# Patient Record
Sex: FEMALE | Race: WHITE | NOT HISPANIC OR LATINO | URBAN - METROPOLITAN AREA
[De-identification: names, ages, dates, MRNs, and addresses within clinical notes are randomized per-mention and may not be internally consistent; named-entity substitution may affect disease eponyms.]

---
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
## 2020-06-02 PROBLEM — Z00.00 ENCOUNTER FOR PREVENTIVE HEALTH EXAMINATION: Status: ACTIVE | Noted: 2020-06-02

## 2020-06-03 ENCOUNTER — APPOINTMENT (OUTPATIENT)
Dept: ORTHOPEDIC SURGERY | Facility: CLINIC | Age: 20
End: 2020-06-03
Payer: MEDICAID

## 2020-06-03 VITALS
BODY MASS INDEX: 33.75 KG/M2 | HEART RATE: 78 BPM | DIASTOLIC BLOOD PRESSURE: 83 MMHG | SYSTOLIC BLOOD PRESSURE: 127 MMHG | HEIGHT: 66 IN | WEIGHT: 210 LBS | TEMPERATURE: 98.5 F

## 2020-06-03 DIAGNOSIS — Z80.9 FAMILY HISTORY OF MALIGNANT NEOPLASM, UNSPECIFIED: ICD-10-CM

## 2020-06-03 DIAGNOSIS — Z78.9 OTHER SPECIFIED HEALTH STATUS: ICD-10-CM

## 2020-06-03 DIAGNOSIS — Z82.61 FAMILY HISTORY OF ARTHRITIS: ICD-10-CM

## 2020-06-03 PROCEDURE — 99203 OFFICE O/P NEW LOW 30 MIN: CPT

## 2020-06-03 RX ORDER — MELOXICAM 7.5 MG/1
7.5 TABLET ORAL
Qty: 30 | Refills: 0 | Status: ACTIVE | COMMUNITY
Start: 2020-06-03 | End: 1900-01-01

## 2020-06-19 ENCOUNTER — TRANSCRIPTION ENCOUNTER (OUTPATIENT)
Age: 20
End: 2020-06-19

## 2020-06-19 ENCOUNTER — APPOINTMENT (OUTPATIENT)
Dept: RADIOLOGY | Facility: CLINIC | Age: 20
End: 2020-06-19
Payer: MEDICAID

## 2020-06-19 ENCOUNTER — OUTPATIENT (OUTPATIENT)
Dept: OUTPATIENT SERVICES | Facility: HOSPITAL | Age: 20
LOS: 1 days | End: 2020-06-19
Payer: COMMERCIAL

## 2020-06-19 ENCOUNTER — RESULT REVIEW (OUTPATIENT)
Age: 20
End: 2020-06-19

## 2020-06-19 ENCOUNTER — APPOINTMENT (OUTPATIENT)
Dept: MRI IMAGING | Facility: CLINIC | Age: 20
End: 2020-06-19
Payer: MEDICAID

## 2020-06-19 DIAGNOSIS — Z00.8 ENCOUNTER FOR OTHER GENERAL EXAMINATION: ICD-10-CM

## 2020-06-19 PROCEDURE — 73564 X-RAY EXAM KNEE 4 OR MORE: CPT | Mod: 26,RT

## 2020-06-19 PROCEDURE — 73721 MRI JNT OF LWR EXTRE W/O DYE: CPT | Mod: 26,RT

## 2020-06-19 PROCEDURE — 73721 MRI JNT OF LWR EXTRE W/O DYE: CPT

## 2020-06-19 PROCEDURE — 73564 X-RAY EXAM KNEE 4 OR MORE: CPT

## 2020-06-23 ENCOUNTER — APPOINTMENT (OUTPATIENT)
Dept: ORTHOPEDIC SURGERY | Facility: AMBULATORY SURGERY CENTER | Age: 20
End: 2020-06-23
Payer: MEDICAID

## 2020-06-23 PROCEDURE — 99214 OFFICE O/P EST MOD 30 MIN: CPT | Mod: 95

## 2020-06-23 RX ORDER — MELOXICAM 15 MG/1
15 TABLET ORAL
Qty: 30 | Refills: 2 | Status: ACTIVE | COMMUNITY
Start: 2020-06-23 | End: 1900-01-01

## 2020-06-23 NOTE — PHYSICAL EXAM
[de-identified] : Physical Exam:\par General: Well appearing, no acute distress, A&O\par Neurologic: A&Ox3, No focal deficits\par Head: NCAT without abrasions, lacerations, or ecchymosis to head, face, or scalp\par Eyes: No scleral icterus, no gross abnormalities\par Respiratory: Equal chest wall expansion bilaterally, no accessory muscle use\par Skin: no erythema\par Psychiatric: Normal mood and affect\par \par Right knee: Tenderness palpation noted directly over the tibial tubercle.  Mild amount of erythema over this area.  Mild swelling noted.  Bony prominences noted.  Patient has weakness and pain subjectively with flexion extension of the knee specifically with flexion.  Range of motion is limited with full extension to only about 95 200 degrees of flexion.  Patient has a negative Thessaly sign.  Patient able to flex and extend toes.  Patient to flex and extend ankle.  Patient able to flex and extend hip. [de-identified] : MRI performed of the knee demonstrates fragmentation at the tibial tubercle.  Fluid surrounding the region.

## 2020-06-23 NOTE — HISTORY OF PRESENT ILLNESS
[Home] : at home, [unfilled] , at the time of the visit. [Medical Office: ([facility])___] : at the medical office located in  [Worsening] : worsening [Verbal consent obtained from patient] : the patient, [unfilled] [___ wks] : [unfilled] week(s) ago [8] : a minimum pain level of 8/10 [Intermit.] : ~He/She~ states the symptoms seem to be intermittent [Walking] : worsened by walking [Knee Flexion] : worsened with knee flexion [Knee Extension] : worsened with knee extension [Rest] : relieved by rest

## 2020-06-23 NOTE — DISCUSSION/SUMMARY
[de-identified] : Nicole is a 20-year-old female with patella tendinitis and inflammation over the tibial tubercle from a chronic Osgood-Schlatter's disease.  I thorough discussion with her regarding the nature of her symptoms as well as all treatment options.  Today she elected for nonoperative care with Voltaren gel oral meloxicam and physical therapy.  Prescription for meloxicam and physical therapy were given to her.  I will see her back in 3 months for clinical reassessment.  She agrees with the above plan and all questions were answered.

## 2020-06-23 NOTE — REVIEW OF SYSTEMS
[Joint Pain] : joint pain [Joint Stiffness] : joint stiffness [Negative] : Endocrine [FreeTextEntry9] : right knee

## 2020-06-29 ENCOUNTER — RX RENEWAL (OUTPATIENT)
Age: 20
End: 2020-06-29

## 2020-07-20 ENCOUNTER — APPOINTMENT (OUTPATIENT)
Dept: ORTHOPEDIC SURGERY | Facility: CLINIC | Age: 20
End: 2020-07-20
Payer: MEDICAID

## 2020-07-20 DIAGNOSIS — G89.29 PAIN IN RIGHT KNEE: ICD-10-CM

## 2020-07-20 DIAGNOSIS — M25.561 PAIN IN RIGHT KNEE: ICD-10-CM

## 2020-07-20 DIAGNOSIS — M23.91 UNSPECIFIED INTERNAL DERANGEMENT OF RIGHT KNEE: ICD-10-CM

## 2020-07-20 PROCEDURE — 99214 OFFICE O/P EST MOD 30 MIN: CPT

## 2020-07-20 NOTE — DISCUSSION/SUMMARY
[de-identified] : Jessie is a 20-year-old female with continued worsening right tibial tubercle pain.  This is secondary to edema near the Osgood-Schlatter fragmentation region she had from prior history.  I thorough discussion with her regarding continued nonoperative care versus operative intervention.  We discussed injections surgery physical therapy.  She has done extensive physical therapy with no relief.  She is not interested in cortisone injections.  After thorough discussion with her she would like to attempt surgical intervention.  I had a thorough discussion with her regarding complication risks as well as high possibility of this not working and possibly making her pain worse.  She is understanding is that research on her own.  She accepts the risks and would like to move forward surgical intervention.  All risks, benefits and alternatives to the proposed surgical procedure, open right tibial tubercle debridement with reattachment of patella tendon, were discussed in great detail with the patient. Risks include but are not limited to pain, bleeding, infection, stiffness, medical complications (including DVT, PE, MI), and risks of anesthesia. The patient expressed understanding and all questions were answered. The patient is electing to proceed, and will have the patient scheduled accordingly.

## 2020-07-20 NOTE — PHYSICAL EXAM
[de-identified] : Physical Exam:\par General: Well appearing, no acute distress, A&O\par Neurologic: A&Ox3, No focal deficits\par Head: NCAT without abrasions, lacerations, or ecchymosis to head, face, or scalp\par Eyes: No scleral icterus, no gross abnormalities\par Respiratory: Equal chest wall expansion bilaterally, no accessory muscle use\par Skin: no erythema\par Psychiatric: Normal mood and affect\par \par Right knee: Tenderness palpation noted directly over the tibial tubercle.  Mild amount of erythema over this area.  Mild swelling noted.  Bony prominences noted.  Patient has weakness and pain subjectively with flexion extension of the knee specifically with flexion.  Range of motion is limited with full extension to only about 95 200 degrees of flexion.  Patient has a negative Thessaly sign.  Patient able to flex and extend toes.  Patient to flex and extend ankle.  Patient able to flex and extend hip. [de-identified] : MRI performed of the knee demonstrates fragmentation at the tibial tubercle.  Fluid surrounding the region.

## 2020-07-20 NOTE — REVIEW OF SYSTEMS
[Joint Pain] : joint pain [Joint Stiffness] : joint stiffness [Negative] : Heme/Lymph [FreeTextEntry9] : right knee

## 2020-07-20 NOTE — HISTORY OF PRESENT ILLNESS
[Worsening] : worsening [___ wks] : [unfilled] week(s) ago [8] : a minimum pain level of 8/10 [Walking] : worsened by walking [Intermit.] : ~He/She~ states the symptoms seem to be intermittent [Knee Flexion] : worsened with knee flexion [Knee Extension] : worsened with knee extension [Rest] : relieved by rest [de-identified] : SERENA is a 20 year female who presents today following up on MRI results of right knee.  He is still significant pain in her patella tendon insertion at the tibial tubercle.  Of note she has done several years of physical therapy and other conservative care.  She still significant pain especially going up stairs up a hill and getting up from a seated position.

## 2020-08-12 ENCOUNTER — OUTPATIENT (OUTPATIENT)
Dept: OUTPATIENT SERVICES | Facility: HOSPITAL | Age: 20
LOS: 1 days | End: 2020-08-12
Payer: MEDICAID

## 2020-08-12 VITALS
DIASTOLIC BLOOD PRESSURE: 56 MMHG | TEMPERATURE: 98 F | HEART RATE: 67 BPM | RESPIRATION RATE: 14 BRPM | SYSTOLIC BLOOD PRESSURE: 112 MMHG | WEIGHT: 249.12 LBS | OXYGEN SATURATION: 100 % | HEIGHT: 66 IN

## 2020-08-12 DIAGNOSIS — Z01.818 ENCOUNTER FOR OTHER PREPROCEDURAL EXAMINATION: ICD-10-CM

## 2020-08-12 DIAGNOSIS — M76.51 PATELLAR TENDINITIS, RIGHT KNEE: ICD-10-CM

## 2020-08-12 LAB
ANION GAP SERPL CALC-SCNC: 6 MMOL/L — SIGNIFICANT CHANGE UP (ref 5–17)
APTT BLD: 29.4 SEC — SIGNIFICANT CHANGE UP (ref 27.5–35.5)
BASOPHILS # BLD AUTO: 0.07 K/UL — SIGNIFICANT CHANGE UP (ref 0–0.2)
BASOPHILS NFR BLD AUTO: 0.7 % — SIGNIFICANT CHANGE UP (ref 0–2)
BUN SERPL-MCNC: 12 MG/DL — SIGNIFICANT CHANGE UP (ref 7–23)
CALCIUM SERPL-MCNC: 8.6 MG/DL — SIGNIFICANT CHANGE UP (ref 8.5–10.1)
CHLORIDE SERPL-SCNC: 110 MMOL/L — HIGH (ref 96–108)
CO2 SERPL-SCNC: 25 MMOL/L — SIGNIFICANT CHANGE UP (ref 22–31)
CREAT SERPL-MCNC: 0.96 MG/DL — SIGNIFICANT CHANGE UP (ref 0.5–1.3)
EOSINOPHIL # BLD AUTO: 0.19 K/UL — SIGNIFICANT CHANGE UP (ref 0–0.5)
EOSINOPHIL NFR BLD AUTO: 1.9 % — SIGNIFICANT CHANGE UP (ref 0–6)
GLUCOSE SERPL-MCNC: 100 MG/DL — HIGH (ref 70–99)
HCT VFR BLD CALC: 40.6 % — SIGNIFICANT CHANGE UP (ref 34.5–45)
HGB BLD-MCNC: 13.9 G/DL — SIGNIFICANT CHANGE UP (ref 11.5–15.5)
IMM GRANULOCYTES NFR BLD AUTO: 0.3 % — SIGNIFICANT CHANGE UP (ref 0–1.5)
INR BLD: 1.04 RATIO — SIGNIFICANT CHANGE UP (ref 0.88–1.16)
LYMPHOCYTES # BLD AUTO: 2.44 K/UL — SIGNIFICANT CHANGE UP (ref 1–3.3)
LYMPHOCYTES # BLD AUTO: 24.7 % — SIGNIFICANT CHANGE UP (ref 13–44)
MCHC RBC-ENTMCNC: 31.5 PG — SIGNIFICANT CHANGE UP (ref 27–34)
MCHC RBC-ENTMCNC: 34.2 GM/DL — SIGNIFICANT CHANGE UP (ref 32–36)
MCV RBC AUTO: 92.1 FL — SIGNIFICANT CHANGE UP (ref 80–100)
MONOCYTES # BLD AUTO: 0.92 K/UL — HIGH (ref 0–0.9)
MONOCYTES NFR BLD AUTO: 9.3 % — SIGNIFICANT CHANGE UP (ref 2–14)
NEUTROPHILS # BLD AUTO: 6.22 K/UL — SIGNIFICANT CHANGE UP (ref 1.8–7.4)
NEUTROPHILS NFR BLD AUTO: 63.1 % — SIGNIFICANT CHANGE UP (ref 43–77)
PLATELET # BLD AUTO: 311 K/UL — SIGNIFICANT CHANGE UP (ref 150–400)
POTASSIUM SERPL-MCNC: 4.2 MMOL/L — SIGNIFICANT CHANGE UP (ref 3.5–5.3)
POTASSIUM SERPL-SCNC: 4.2 MMOL/L — SIGNIFICANT CHANGE UP (ref 3.5–5.3)
PROTHROM AB SERPL-ACNC: 12.2 SEC — SIGNIFICANT CHANGE UP (ref 10.6–13.6)
RBC # BLD: 4.41 M/UL — SIGNIFICANT CHANGE UP (ref 3.8–5.2)
RBC # FLD: 12.1 % — SIGNIFICANT CHANGE UP (ref 10.3–14.5)
SODIUM SERPL-SCNC: 141 MMOL/L — SIGNIFICANT CHANGE UP (ref 135–145)
WBC # BLD: 9.87 K/UL — SIGNIFICANT CHANGE UP (ref 3.8–10.5)
WBC # FLD AUTO: 9.87 K/UL — SIGNIFICANT CHANGE UP (ref 3.8–10.5)

## 2020-08-12 PROCEDURE — 85025 COMPLETE CBC W/AUTO DIFF WBC: CPT

## 2020-08-12 PROCEDURE — 85730 THROMBOPLASTIN TIME PARTIAL: CPT

## 2020-08-12 PROCEDURE — 85610 PROTHROMBIN TIME: CPT

## 2020-08-12 PROCEDURE — G0463: CPT | Mod: 25

## 2020-08-12 PROCEDURE — 36415 COLL VENOUS BLD VENIPUNCTURE: CPT

## 2020-08-12 PROCEDURE — 80048 BASIC METABOLIC PNL TOTAL CA: CPT

## 2020-08-12 NOTE — H&P PST ADULT - ATTENDING COMMENTS
Orthopedic Sports Attending:  Agree with above resident/PA note.  Note edited where necessary.      Patient seen and examined at bedside today. Discussed the consent for surgery and discussed risks and complications. Patient understands and would like to move forward with surgical intervention  Isma Stubbs, DO  Orthopaedic Surgery

## 2020-08-12 NOTE — H&P PST ADULT - NSICDXPASTMEDICALHX_GEN_ALL_CORE_FT
PAST MEDICAL HISTORY:  Anxiety     Menorrhagia     Patellar tendinitis right knee    Post traumatic stress disorder (PTSD)     Right knee pain

## 2020-08-12 NOTE — H&P PST ADULT - PRO PAIN LIFE ADAPT
inability to sleep/inability or reluctance to perform ADLs/inability to enjoy life/decreased activity level

## 2020-08-12 NOTE — H&P PST ADULT - ASSESSMENT
yo scheduled for   with      1. Labs as per surgeon  2. EKG  3. Medical optimization with  4. discussed EZ sponges & PST day of procedure instructions. NPO as per instructions from ASU  5. Instructed to increase po fluids the day before surgery. Instructed to hold aspirin, NSAIDs, fish oil, vitamins & herbal supplements 7 days prior to surgery  6. CXR  7. Pt instructed to continue self-quarantine @ home  COVID-19 PCR swab to be done in hospital on    consent signed & on chart. order entered in computer 21 yo female scheduled for right knee open patellar tendon & tibial tubercle debridement  with patellar tendon reattachment on 8/20/2020 with Dr. Stubbs    1. CBC w diff, BMP, PT/INT/PTT  2. discussed EZ sponges & PST day of procedure instructions. NPO as per instructions from ASU  3. Instructed to increase po fluids the day before surgery. Instructed to hold aspirin, NSAIDs, no meloxicam, fish oil, vitamins & herbal supplements 7 days prior to surgery  4. COVID-19 PCR swab to be done in hospital on 8/17, pt aware.   consent signed & on chart. order entered in computer  5. Pt advised to make appt for baseline pap smear. Encouraged patient to discuss possible option of medical marijuana for management of PTSD/anxiety with PCP, pt verbalized understanding.   6. stat urine pregnancy day of surgery

## 2020-08-12 NOTE — H&P PST ADULT - HISTORY OF PRESENT ILLNESS
21 yo female presents to PST. c/o chronic right anterior knee pain. Denies specific mechanism of injury. Reports right knee pain increased over past 6 months & f/u with Dr Stubbs. Reports having xray & MRI of right knee. States "it's unresolved Osgood-Schlatter's". Reports meloxicam didn't help knee pain. c/o occasional right knee swelling. Pt is coming in for right knee patellar tendon surgery. 19 yo female presents to PST. c/o chronic right anterior knee pain since childhood. Denies specific mechanism of injury. Reports right knee pain increased over past 6 months & f/u with Dr Stubbs. Reports having xray & MRI of right knee. States "it's unresolved Osgood-Schlatter's". Reports meloxicam didn't help knee pain. c/o occasional right knee swelling. Pt is coming in for right knee patellar tendon surgery.

## 2020-08-12 NOTE — H&P PST ADULT - HEALTH CARE MAINTENANCE
Patient denies travel outside [place] or the state within past 21 days. Denies cold, cough, loss of smell/taste, flu-like symptoms or fever. Denies contact with persons with diagnosed COVID-19/coronavirus.

## 2020-08-12 NOTE — H&P PST ADULT - NSICDXFAMILYHX_GEN_ALL_CORE_FT
FAMILY HISTORY:  Family history of schizophrenia, uncle living    Father  Still living? Yes, Estimated age: Age Unknown  Family history of diabetes mellitus in father, Age at diagnosis: Age Unknown    Grandparent  Still living? No  Family history of colon cancer, Age at diagnosis: Age Unknown  Family history of ovarian cancer, Age at diagnosis: Age Unknown

## 2020-08-13 ENCOUNTER — NON-APPOINTMENT (OUTPATIENT)
Age: 20
End: 2020-08-13

## 2020-08-13 DIAGNOSIS — Z01.818 ENCOUNTER FOR OTHER PREPROCEDURAL EXAMINATION: ICD-10-CM

## 2020-08-13 DIAGNOSIS — M76.899 OTHER SPECIFIED ENTHESOPATHIES OF UNSPECIFIED LOWER LIMB, EXCLUDING FOOT: ICD-10-CM

## 2020-08-13 DIAGNOSIS — M76.51 PATELLAR TENDINITIS, RIGHT KNEE: ICD-10-CM

## 2020-08-17 ENCOUNTER — OUTPATIENT (OUTPATIENT)
Dept: OUTPATIENT SERVICES | Facility: HOSPITAL | Age: 20
LOS: 1 days | End: 2020-08-17
Payer: MEDICAID

## 2020-08-17 DIAGNOSIS — Z11.59 ENCOUNTER FOR SCREENING FOR OTHER VIRAL DISEASES: ICD-10-CM

## 2020-08-17 PROCEDURE — U0003: CPT

## 2020-08-18 DIAGNOSIS — Z11.59 ENCOUNTER FOR SCREENING FOR OTHER VIRAL DISEASES: ICD-10-CM

## 2020-08-18 LAB — SARS-COV-2 RNA SPEC QL NAA+PROBE: SIGNIFICANT CHANGE UP

## 2020-08-19 RX ORDER — OXYCODONE HYDROCHLORIDE 5 MG/1
5 TABLET ORAL ONCE
Refills: 0 | Status: DISCONTINUED | OUTPATIENT
Start: 2020-08-20 | End: 2020-08-20

## 2020-08-19 RX ORDER — FENTANYL CITRATE 50 UG/ML
50 INJECTION INTRAVENOUS
Refills: 0 | Status: DISCONTINUED | OUTPATIENT
Start: 2020-08-20 | End: 2020-08-20

## 2020-08-19 RX ORDER — SODIUM CHLORIDE 9 MG/ML
1000 INJECTION, SOLUTION INTRAVENOUS
Refills: 0 | Status: DISCONTINUED | OUTPATIENT
Start: 2020-08-20 | End: 2020-08-20

## 2020-08-20 ENCOUNTER — RESULT REVIEW (OUTPATIENT)
Age: 20
End: 2020-08-20

## 2020-08-20 ENCOUNTER — APPOINTMENT (OUTPATIENT)
Dept: ORTHOPEDIC SURGERY | Facility: HOSPITAL | Age: 20
End: 2020-08-20

## 2020-08-20 ENCOUNTER — OUTPATIENT (OUTPATIENT)
Dept: INPATIENT UNIT | Facility: HOSPITAL | Age: 20
LOS: 1 days | Discharge: HOME | End: 2020-08-20
Payer: MEDICAID

## 2020-08-20 VITALS
DIASTOLIC BLOOD PRESSURE: 66 MMHG | TEMPERATURE: 98 F | OXYGEN SATURATION: 95 % | HEART RATE: 79 BPM | RESPIRATION RATE: 16 BRPM | HEIGHT: 66 IN | SYSTOLIC BLOOD PRESSURE: 118 MMHG | WEIGHT: 249.12 LBS

## 2020-08-20 VITALS
SYSTOLIC BLOOD PRESSURE: 117 MMHG | TEMPERATURE: 98 F | RESPIRATION RATE: 15 BRPM | HEART RATE: 61 BPM | OXYGEN SATURATION: 97 % | DIASTOLIC BLOOD PRESSURE: 59 MMHG

## 2020-08-20 DIAGNOSIS — M23.91 UNSPECIFIED INTERNAL DERANGEMENT OF RIGHT KNEE: ICD-10-CM

## 2020-08-20 DIAGNOSIS — M76.51 PATELLAR TENDINITIS, RIGHT KNEE: ICD-10-CM

## 2020-08-20 LAB — HCG UR QL: NEGATIVE — SIGNIFICANT CHANGE UP

## 2020-08-20 PROCEDURE — 27360 PARTIAL REMOVAL LEG BONE(S): CPT | Mod: RT

## 2020-08-20 PROCEDURE — C1713: CPT

## 2020-08-20 PROCEDURE — 81025 URINE PREGNANCY TEST: CPT

## 2020-08-20 PROCEDURE — 27380 REPAIR OF KNEECAP TENDON: CPT | Mod: RT

## 2020-08-20 PROCEDURE — 76000 FLUOROSCOPY <1 HR PHYS/QHP: CPT

## 2020-08-20 RX ORDER — OXYCODONE 5 MG/1
5 TABLET ORAL
Qty: 30 | Refills: 0 | Status: COMPLETED | COMMUNITY
Start: 2020-08-20 | End: 2020-08-27

## 2020-08-20 RX ORDER — OXYCODONE HYDROCHLORIDE 5 MG/1
10 TABLET ORAL ONCE
Refills: 0 | Status: DISCONTINUED | OUTPATIENT
Start: 2020-08-20 | End: 2020-08-20

## 2020-08-20 RX ORDER — ONDANSETRON 8 MG/1
4 TABLET, FILM COATED ORAL ONCE
Refills: 0 | Status: COMPLETED | OUTPATIENT
Start: 2020-08-20 | End: 2020-08-20

## 2020-08-20 RX ORDER — ASPIRIN/CALCIUM CARB/MAGNESIUM 324 MG
1 TABLET ORAL
Qty: 30 | Refills: 0
Start: 2020-08-20 | End: 2020-09-18

## 2020-08-20 RX ORDER — ACETAMINOPHEN 500 MG/1
500 TABLET ORAL
Qty: 120 | Refills: 0 | Status: COMPLETED | COMMUNITY
Start: 2020-08-20 | End: 2020-09-09

## 2020-08-20 RX ORDER — ASPIRIN 325 MG/1
325 TABLET ORAL
Qty: 28 | Refills: 0 | Status: COMPLETED | COMMUNITY
Start: 2020-08-20 | End: 2020-09-17

## 2020-08-20 RX ORDER — ONDANSETRON 4 MG/1
4 TABLET ORAL
Qty: 30 | Refills: 0 | Status: COMPLETED | COMMUNITY
Start: 2020-08-20 | End: 2020-08-25

## 2020-08-20 RX ADMIN — OXYCODONE HYDROCHLORIDE 10 MILLIGRAM(S): 5 TABLET ORAL at 11:47

## 2020-08-20 RX ADMIN — ONDANSETRON 4 MILLIGRAM(S): 8 TABLET, FILM COATED ORAL at 13:14

## 2020-08-20 RX ADMIN — OXYCODONE HYDROCHLORIDE 10 MILLIGRAM(S): 5 TABLET ORAL at 12:01

## 2020-08-20 RX ADMIN — FENTANYL CITRATE 50 MICROGRAM(S): 50 INJECTION INTRAVENOUS at 12:02

## 2020-08-20 RX ADMIN — FENTANYL CITRATE 50 MICROGRAM(S): 50 INJECTION INTRAVENOUS at 12:25

## 2020-08-20 RX ADMIN — FENTANYL CITRATE 50 MICROGRAM(S): 50 INJECTION INTRAVENOUS at 12:05

## 2020-08-20 RX ADMIN — FENTANYL CITRATE 50 MICROGRAM(S): 50 INJECTION INTRAVENOUS at 11:54

## 2020-08-20 NOTE — ASU DISCHARGE PLAN (ADULT/PEDIATRIC) - ASU DC SPECIAL INSTRUCTIONSFT
weightbearing as tolerated right lower extremity in lilibeth, no knee flexion  remove ACE and webril in 48 hours, leave underlying steri strips intact, do not soak  follow up in 7-10 days, call office for appointment

## 2020-08-20 NOTE — ASU DISCHARGE PLAN (ADULT/PEDIATRIC) - ACTIVITY LEVEL
Weight bearing as tolerated/weightbearing as tolerated right lower extremity in lilibeth, no knee flexion

## 2020-08-20 NOTE — ASU PATIENT PROFILE, ADULT - PMH
Anxiety    Menorrhagia    Patellar tendinitis  right knee  Post traumatic stress disorder (PTSD)    Right knee pain

## 2020-08-26 DIAGNOSIS — M92.51 JUVENILE OSTEOCHONDROSIS OF PROXIMAL TIBIA: ICD-10-CM

## 2020-08-26 DIAGNOSIS — Z79.1 LONG TERM (CURRENT) USE OF NON-STEROIDAL ANTI-INFLAMMATORIES (NSAID): ICD-10-CM

## 2020-08-26 DIAGNOSIS — M25.861 OTHER SPECIFIED JOINT DISORDERS, RIGHT KNEE: ICD-10-CM

## 2020-08-26 DIAGNOSIS — E66.01 MORBID (SEVERE) OBESITY DUE TO EXCESS CALORIES: ICD-10-CM

## 2020-08-26 DIAGNOSIS — M25.561 PAIN IN RIGHT KNEE: ICD-10-CM

## 2020-08-26 DIAGNOSIS — M66.261 SPONTANEOUS RUPTURE OF EXTENSOR TENDONS, RIGHT LOWER LEG: ICD-10-CM

## 2020-08-26 DIAGNOSIS — F43.10 POST-TRAUMATIC STRESS DISORDER, UNSPECIFIED: ICD-10-CM

## 2020-08-26 DIAGNOSIS — M76.51 PATELLAR TENDINITIS, RIGHT KNEE: ICD-10-CM

## 2020-08-31 ENCOUNTER — APPOINTMENT (OUTPATIENT)
Dept: ORTHOPEDIC SURGERY | Facility: CLINIC | Age: 20
End: 2020-08-31
Payer: MEDICAID

## 2020-08-31 PROBLEM — M76.50: Chronic | Status: ACTIVE | Noted: 2020-08-12

## 2020-08-31 PROBLEM — M25.561 PAIN IN RIGHT KNEE: Chronic | Status: ACTIVE | Noted: 2020-08-12

## 2020-08-31 PROBLEM — F43.10 POST-TRAUMATIC STRESS DISORDER, UNSPECIFIED: Chronic | Status: ACTIVE | Noted: 2020-08-12

## 2020-08-31 PROBLEM — F41.9 ANXIETY DISORDER, UNSPECIFIED: Chronic | Status: ACTIVE | Noted: 2020-08-12

## 2020-08-31 PROBLEM — N92.0 EXCESSIVE AND FREQUENT MENSTRUATION WITH REGULAR CYCLE: Chronic | Status: ACTIVE | Noted: 2020-08-12

## 2020-08-31 PROCEDURE — 73560 X-RAY EXAM OF KNEE 1 OR 2: CPT | Mod: RT

## 2020-08-31 PROCEDURE — 99024 POSTOP FOLLOW-UP VISIT: CPT

## 2020-08-31 NOTE — HISTORY OF PRESENT ILLNESS
[de-identified] : Jessie is a 20-year-old female status post removal of Osgood-Schlatter tibial tubercle fragment right knee.  She is two-week status post surgery and doing well.  She has minimal pain in her knee.  We did have to repair her patella tendon as well.  She is wearing the brace well.  She has no complaints.  She has not started physical therapy. [de-identified] : Jessie is a 20-year-old female status post removal of loose body near tibial tubercle and patella tendon repair.  Overall she is doing extremely well.  She will start physical therapy to regain range of motion of her knee.  She may be weightbearing as tolerated locked in extension.  I will see her back in 4 weeks for clinical reassessment.  She agrees with the above plan and all questions were answered. [de-identified] : 2 views of the right knee were performed today available for me to review.  They demonstrate removal of Osgood-Schlatter fragment.  No fracture.  No deformities. [de-identified] : Incision clean dry and intact.  No surrounding erythema.  Range of motion is full extension to 45 degrees of flexion.  No motor and sensation intact from L4-S1.  Dorsalis pedis 2+

## 2020-09-29 ENCOUNTER — APPOINTMENT (OUTPATIENT)
Dept: ORTHOPEDIC SURGERY | Facility: CLINIC | Age: 20
End: 2020-09-29
Payer: MEDICAID

## 2020-09-29 DIAGNOSIS — M92.51 JUVENILE OSTEOCHONDROSIS OF TIBIA AND FIBULA, RIGHT LEG: ICD-10-CM

## 2020-09-29 DIAGNOSIS — Z98.890 OTHER SPECIFIED POSTPROCEDURAL STATES: ICD-10-CM

## 2020-09-29 PROCEDURE — 99024 POSTOP FOLLOW-UP VISIT: CPT

## 2020-09-29 NOTE — HISTORY OF PRESENT ILLNESS
[de-identified] : s/p post removal of Osgood-Schlatter tibial tubercle fragment right knee,  DOS: 8/20/2020 [de-identified] : Jessie is a 20-year-old female status post removal of Osgood-Schlatter tibial tubercle fragment right knee.  She is four-week status post surgery and doing well.  She has minimal pain in her knee.  We did have to repair her patella tendon as well.  She is wearing the brace well.  She has no complaints.  She has started physical therapy without complaints. She comes in today smelling of marijuana. [de-identified] : Right Knee\par \par Incision clean dry and intact.  No surrounding erythema.  Range of motion is full extension to 45 degrees of flexion.  No motor and sensation intact from L4-S1.  Dorsalis pedis 2+ [de-identified] : No new imaging performed [de-identified] : Jessie is a 20-year-old female status post removal of loose body near tibial tubercle and patella tendon repair.  Overall she is doing extremely well.  She will continue physical therapy to regain range of motion of her knee.  She may be weightbearing as tolerated within the short runner brace that we provided her today.  I will see her back in 6 weeks for clinical reassessment.  She agrees with the above plan and all questions were answered.

## 2020-11-19 ENCOUNTER — APPOINTMENT (OUTPATIENT)
Dept: ORTHOPEDIC SURGERY | Facility: CLINIC | Age: 20
End: 2020-11-19

## 2020-12-17 ENCOUNTER — APPOINTMENT (OUTPATIENT)
Dept: ORTHOPEDIC SURGERY | Facility: CLINIC | Age: 20
End: 2020-12-17

## 2021-10-23 ENCOUNTER — EMERGENCY (EMERGENCY)
Facility: HOSPITAL | Age: 21
LOS: 1 days | Discharge: HOME | End: 2021-10-23
Attending: EMERGENCY MEDICINE | Admitting: EMERGENCY MEDICINE
Payer: COMMERCIAL

## 2021-10-23 VITALS
HEIGHT: 66 IN | WEIGHT: 235.01 LBS | SYSTOLIC BLOOD PRESSURE: 119 MMHG | RESPIRATION RATE: 20 BRPM | HEART RATE: 112 BPM | DIASTOLIC BLOOD PRESSURE: 74 MMHG | TEMPERATURE: 99 F | OXYGEN SATURATION: 96 %

## 2021-10-23 VITALS
DIASTOLIC BLOOD PRESSURE: 71 MMHG | RESPIRATION RATE: 19 BRPM | HEART RATE: 88 BPM | SYSTOLIC BLOOD PRESSURE: 110 MMHG | TEMPERATURE: 99 F | OXYGEN SATURATION: 97 %

## 2021-10-23 PROCEDURE — 99283 EMERGENCY DEPT VISIT LOW MDM: CPT

## 2021-10-23 RX ORDER — MELOXICAM 15 MG/1
1 TABLET ORAL
Qty: 0 | Refills: 0 | DISCHARGE

## 2021-10-23 RX ORDER — FAMOTIDINE 10 MG/ML
20 INJECTION INTRAVENOUS ONCE
Refills: 0 | Status: COMPLETED | OUTPATIENT
Start: 2021-10-23 | End: 2021-10-23

## 2021-10-23 RX ORDER — NORETHINDRONE AND ETHINYL ESTRADIOL 0.4-0.035
1 KIT ORAL
Qty: 0 | Refills: 0 | DISCHARGE

## 2021-10-23 RX ORDER — DIPHENHYDRAMINE HCL 50 MG
50 CAPSULE ORAL ONCE
Refills: 0 | Status: COMPLETED | OUTPATIENT
Start: 2021-10-23 | End: 2021-10-23

## 2021-10-23 RX ADMIN — Medication 50 MILLIGRAM(S): at 20:58

## 2021-10-23 RX ADMIN — Medication 60 MILLIGRAM(S): at 20:58

## 2021-10-23 RX ADMIN — FAMOTIDINE 20 MILLIGRAM(S): 10 INJECTION INTRAVENOUS at 20:58

## 2021-10-23 NOTE — ED ADULT NURSE NOTE - OBJECTIVE STATEMENT
I noticed my lower lip was swollen around 3pm. I took a nap and when I woke up my lower lip was even more swollen and my top lip slightly swollen. denies allergies. Pt has had laryngitis since Tuesday. Denies diff breathing. negative results  for Covid on Thursday,    Patient denies sick contacts/ no fever/chills/cough at this time, denies SOB and no acute distress. lungs cta, noted lower lip is more swollen than upper, will continue to monitor.

## 2021-10-23 NOTE — ED PROVIDER NOTE - OBJECTIVE STATEMENT
22 y/o female with no PMHx presents to the ED c/o facial swelling at 3PM. Patient reports she was drinking a slurpy flavored cherry and blue raspberry, with subsequent swelling of lips. Denies rash or itching in arms of legs. NKDA and had a difficulty breathing and pain with swallowing, with throat pain.

## 2021-10-23 NOTE — ED ADULT TRIAGE NOTE - CHIEF COMPLAINT QUOTE
I noticed my lower lip was swollen around 3pm. I took a nap and when I woke up my lower lip was even more swollen and my top lip slightly swollen. denies allergies. Pt has had laryngitis since Tuesday. Denies diff breathing. negative results  for Covid on Thursday

## 2021-10-23 NOTE — ED PROVIDER NOTE - NS_ ATTENDINGSCRIBEDETAILS _ED_A_ED_FT
Adonay Quintana MD - The scribe's documentation has been prepared under my direction and personally reviewed by me in its entirety. I confirm that the note above accurately reflects all work, treatment, procedures, and medical decision making performed by me.

## 2021-10-23 NOTE — ED ADULT NURSE NOTE - PAIN RATING/NUMBER SCALE (0-10): ACTIVITY
7 Cellcept Pregnancy And Lactation Text: This medication is Pregnancy Category D and isn't considered safe during pregnancy. It is unknown if this medication is excreted in breast milk.

## 2021-10-23 NOTE — ED PROVIDER NOTE - PATIENT PORTAL LINK FT
You can access the FollowMyHealth Patient Portal offered by [facility] by registering at the following website: [email or web address]. By joining [employer]’s FollowMyHealth portal, you will also be able to view your health information using other applications (apps) compatible with our system.

## 2021-10-23 NOTE — ED PROVIDER NOTE - ENMT, MLM
mild swelling of lips mouth and pharynx normal, no other facial swelling Airway patent, Nasal mucosa clear. Throat has no vesicles, no oropharyngeal exudates and uvula is midline.

## 2022-02-26 ENCOUNTER — EMERGENCY (EMERGENCY)
Facility: HOSPITAL | Age: 22
LOS: 1 days | Discharge: HOME | End: 2022-02-26
Attending: EMERGENCY MEDICINE | Admitting: EMERGENCY MEDICINE
Payer: COMMERCIAL

## 2022-02-26 VITALS
TEMPERATURE: 98 F | HEART RATE: 82 BPM | WEIGHT: 242.07 LBS | OXYGEN SATURATION: 97 % | HEIGHT: 66 IN | SYSTOLIC BLOOD PRESSURE: 134 MMHG | RESPIRATION RATE: 16 BRPM | DIASTOLIC BLOOD PRESSURE: 83 MMHG

## 2022-02-26 VITALS
HEART RATE: 84 BPM | SYSTOLIC BLOOD PRESSURE: 130 MMHG | OXYGEN SATURATION: 98 % | RESPIRATION RATE: 18 BRPM | TEMPERATURE: 98 F | DIASTOLIC BLOOD PRESSURE: 86 MMHG

## 2022-02-26 LAB — HCG UR QL: NEGATIVE — SIGNIFICANT CHANGE UP

## 2022-02-26 PROCEDURE — 81025 URINE PREGNANCY TEST: CPT

## 2022-02-26 PROCEDURE — 99283 EMERGENCY DEPT VISIT LOW MDM: CPT | Mod: 25

## 2022-02-26 PROCEDURE — 73630 X-RAY EXAM OF FOOT: CPT | Mod: 26,RT

## 2022-02-26 PROCEDURE — 73630 X-RAY EXAM OF FOOT: CPT

## 2022-02-26 PROCEDURE — 99283 EMERGENCY DEPT VISIT LOW MDM: CPT

## 2022-02-26 RX ORDER — ACETAMINOPHEN 500 MG
650 TABLET ORAL ONCE
Refills: 0 | Status: DISCONTINUED | OUTPATIENT
Start: 2022-02-26 | End: 2022-02-26

## 2022-02-26 RX ORDER — IBUPROFEN 200 MG
600 TABLET ORAL ONCE
Refills: 0 | Status: COMPLETED | OUTPATIENT
Start: 2022-02-26 | End: 2022-02-26

## 2022-02-26 RX ADMIN — Medication 600 MILLIGRAM(S): at 18:50

## 2022-02-26 RX ADMIN — Medication 600 MILLIGRAM(S): at 19:30

## 2022-02-26 NOTE — ED PROVIDER NOTE - NSFOLLOWUPINSTRUCTIONS_ED_ALL_ED_FT
Consider use of crutches and NSAIDs for pain. Follow up with podiatry. Return for worsening pain, numbness/weakness, rash, worsening condition.       Foot Pain      Many things can cause foot pain. Some common causes are:  •An injury.      •A sprain.      •Arthritis.      •Blisters.      •Bunions.        Follow these instructions at home:      Managing pain, stiffness, and swelling      If directed, put ice on the painful area:  •Put ice in a plastic bag.      •Place a towel between your skin and the bag.      •Leave the ice on for 20 minutes, 2–3 times a day.      Activity     • Do not stand or walk for long periods.      •Return to your normal activities as told by your health care provider. Ask your health care provider what activities are safe for you.      •Do stretches to relieve foot pain and stiffness as told by your health care provider.      • Do not lift anything that is heavier than 10 lb (4.5 kg), or the limit that you are told, until your health care provider says that it is safe. Lifting a lot of weight can put added pressure on your feet.      Lifestyle     •Wear comfortable, supportive shoes that fit you well. Do not wear high heels.      •Keep your feet clean and dry.      General instructions     •Take over-the-counter and prescription medicines only as told by your health care provider.      •Rub your foot gently.      •Pay attention to any changes in your symptoms.      •Keep all follow-up visits as told by your health care provider. This is important.        Contact a health care provider if:    •Your pain does not get better after a few days of self-care.      •Your pain gets worse.      •You cannot stand on your foot.        Get help right away if:    •Your foot is numb or tingling.      •Your foot or toes are swollen.      •Your foot or toes turn white or blue.      •You have warmth and redness along your foot.        Summary    •Common causes of foot pain are injury, sprain, arthritis, blisters or bunions.      •Ice, medicines, and comfortable shoes may help foot pain.      •Contact your health care provider if your pain does not get better after a few days of self-care.      This information is not intended to replace advice given to you by your health care provider. Make sure you discuss any questions you have with your health care provider.

## 2022-02-26 NOTE — ED PROVIDER NOTE - CLINICAL SUMMARY MEDICAL DECISION MAKING FREE TEXT BOX
presents today due to right foot pain x 1 day. pt reports pain began at work yesterday but there was no work related injury.  pt reports pain worse with walking and notes works as a  so she is on her feet for prolonged periods of time. plan includes xray r/o fx, pain control.

## 2022-02-26 NOTE — ED PROVIDER NOTE - PATIENT PORTAL LINK FT
You can access the FollowMyHealth Patient Portal offered by [facility] by registering at the following website: [email or web address]. By joining [employer]’s FollowMyHealth portal, you will also be able to view your health information using other applications (apps) compatible with our system.

## 2022-02-26 NOTE — ED PROVIDER NOTE - PROGRESS NOTE DETAILS
Teresa PARKER for ED attending, Dr. Tellez: 21 y/o F w/ PMHx of PTSD, anxiety, patellar tendinitis, menorrhagia, and R knee pain  presents to ED c/o R foot pain since yesterday worse w/ palpation or walking. Pt relates she works at [employer] and pain began while working. Denies injury, weakness, numbness, or any other complaints.   PE:  GEN: well developed, well nourished, NAD  MSK: R ankle nontender full range of motion, foot +mild tenderness to mid foot, toes nontender, distal sensation intact, cap refill less than 2 seconds all toes. Teresa PARKER for ED attending, Dr. Tellez: 23 y/o F w/ PMHx of PTSD, anxiety, patellar tendinitis, menorrhagia, and R knee pain  presents to ED c/o R foot pain since yesterday worse w/ palpation or walking. Pt relates she works as  and pain began while working. Denies injury, weakness, numbness, or any other complaints.   PE:  GEN: well developed, well nourished, NAD  MSK: R ankle nontender full range of motion, foot +mild tenderness to mid foot, toes nontender, distal sensation intact, cap refill less than 2 seconds all toes.

## 2022-02-26 NOTE — ED PROVIDER NOTE - CARE PROVIDER_API CALL
Sebastian Duncan (DPM)  Podiatric Medicine and Surgery  [address] [place] [address]  Phone: [phone]  Fax: [phone]  Follow Up Time: 1-3 Days

## 2022-02-26 NOTE — ED PROVIDER NOTE - PHYSICAL EXAMINATION
Constitutional: Awake, Alert, non-toxic. NAD. Well appearing, well nourished.   HEAD: Normocephalic, atraumatic.   EYES: EOM intact, conjunctiva and sclera are clear bilaterally.   ENT: No rhinorrhea, patent, mucous membranes pink/moist, no drooling or stridor.   NECK: Supple, non-tender  RESPIRATORY: Normal respiratory effort  EXTREMITIES: Full passive and active ROM in all extremities; (+) right foot 2nd MT TTP, no erythema, no 1st MTP TTP, no edema, negative tatum sign. knee non-tender to palpation; distal pulses palpable and symmetric  SKIN: Warm, dry; good skin turgor, no apparent lesions or rashes, no ecchymosis, brisk capillary refill.  NEURO: A&O x3. Sensory and motor functions are grossly intact. Speech is normal. Appearance and judgement seem appropriate for gender and age.

## 2022-02-26 NOTE — ED PROVIDER NOTE - OBJECTIVE STATEMENT
23 y/o female without reported PMHx presents today due to right foot pain x 1 day. pt reports pain began at work yesterday but there was no work related injury. pt describes pain as aching, non-radiating, and currently 5/10. pt reports pain worse with walking and notes works as a  so she is on her feet for prolonged periods of time. pt denies trauma/fall, numbness/weakness, rash, or any other complaints.

## 2022-07-18 NOTE — ED ADULT NURSE NOTE - CAS EDN DISCHARGE ASSESSMENT
Problem: Knowledge Deficit - Standard  Goal: Patient and family/care givers will demonstrate understanding of plan of care, disease process/condition, diagnostic tests and medications  Outcome: Progressing  Note: Pt AOx3 able to make needs known, pt verbalize understanding of POC.     Problem: Fall Risk  Goal: Patient will remain free from falls  Outcome: Progressing  Note: Guillory 8, Low risk for falls.  Non skid socks, fall band on, hourly rounding in place, call light within reach, path free of clutter. Bed alarm on. Pt calls appropriately. Education provided on need to call staff for assistance with mobility and pt states understanding.    The patient is Stable - Low risk of patient condition declining or worsening    Shift Goals  Clinical Goals: Rest; Mobility  Patient Goals: rest  Family Goals: rest, cath lab monday    Progress made toward(s) clinical / shift goals:  24 hour EEG placement, rest, tele monitor.     Patient is not progressing towards the following goals:       Alert and oriented to person, place and time

## 2022-08-16 NOTE — H&P PST ADULT - TEMPERATURE IN FAHRENHEIT (DEGREES F)
[FreeTextEntry1] : Assessment:\par GREGORY\par Obesity\par \par PLAN:\par The patient is benefitting from the PAP device .  He needs a new CPAP unit ordered.\par New supplies ordered \par Weight loss discussed\par I stressed the need maintain compliance  with the PAP device.\par The patient is not to use an Ozone or UV sterilizer. \par F/U in 3 months\par \par  98.4

## 2023-10-06 NOTE — ED ADULT NURSE NOTE - ED COMFORT CARE
Followed by Cardiology in the outpatient setting.  Euvolemic and well compensated currently.  Continue home medical therapy     Patient informed/Explanation of wait

## 2024-04-01 ENCOUNTER — EMERGENCY (EMERGENCY)
Facility: HOSPITAL | Age: 24
LOS: 1 days | Discharge: HOME | End: 2024-04-01
Attending: EMERGENCY MEDICINE | Admitting: EMERGENCY MEDICINE
Payer: MEDICAID

## 2024-04-01 VITALS
DIASTOLIC BLOOD PRESSURE: 88 MMHG | SYSTOLIC BLOOD PRESSURE: 125 MMHG | RESPIRATION RATE: 16 BRPM | TEMPERATURE: 98 F | HEART RATE: 64 BPM | WEIGHT: 231.49 LBS | HEIGHT: 66 IN | OXYGEN SATURATION: 98 %

## 2024-04-01 VITALS
TEMPERATURE: 98 F | SYSTOLIC BLOOD PRESSURE: 116 MMHG | DIASTOLIC BLOOD PRESSURE: 73 MMHG | RESPIRATION RATE: 18 BRPM | HEART RATE: 68 BPM | OXYGEN SATURATION: 98 %

## 2024-04-01 PROCEDURE — 99283 EMERGENCY DEPT VISIT LOW MDM: CPT

## 2024-04-01 PROCEDURE — 73610 X-RAY EXAM OF ANKLE: CPT

## 2024-04-01 RX ORDER — ACETAMINOPHEN 500 MG
650 TABLET ORAL ONCE
Refills: 0 | Status: COMPLETED | OUTPATIENT
Start: 2024-04-01 | End: 2024-04-01

## 2024-04-01 RX ADMIN — Medication 650 MILLIGRAM(S): at 18:12

## 2024-04-01 NOTE — ED ADULT NURSE NOTE - OBJECTIVE STATEMENT
Pt came in ambulatory complains of pain and swelling left ankle - associated with a fall incident happened yesterday.

## 2024-04-01 NOTE — ED PROVIDER NOTE - CARE PROVIDER_API CALL
Víctor Felipe-Samuel  Foot Surgery  [address] [place] [address]  Phone: [phone]  Fax: [phone]  Follow Up Time: 1-3 Days    Getachew Benitez  Orthopaedic Surgery  [address] [facility], [facility] [address]  [place] [address]  Phone: [phone]  Fax: [phone]  Follow Up Time: 1-3 Days

## 2024-04-01 NOTE — ED PROVIDER NOTE - PHYSICAL EXAMINATION
Constitutional: Awake, Alert, non-toxic. NAD. Well appearing, well nourished.   HEAD: Normocephalic, atraumatic.   EYES: EOM intact, conjunctiva and sclera are clear bilaterally.   ENT: No rhinorrhea, patent, mucous membranes pink/moist, no drooling or stridor.   NECK: Supple, non-tender  RESPIRATORY: Normal respiratory effort  EXTREMITIES: Full passive and active ROM in all extremities; (+) left lateral ankle TTP and swelling, no proximal tib/fib TTP, knee and MTs non-tender to palpation; Negative Leroy test. distal pulses palpable and symmetric  SKIN: Warm, dry; good skin turgor, no apparent lesions or rashes, no ecchymosis, brisk capillary refill.  NEURO: A&O x3. Sensory and motor functions are grossly intact. Speech is normal. Appearance and judgement seem appropriate for gender and age.

## 2024-04-01 NOTE — ED PROVIDER NOTE - PATIENT PORTAL LINK FT
You can access the FollowMyHealth Patient Portal offered by [facility] by registering at the following website: [email or web address]. By joining [employer]’s FollowMyHealth portal, you will also be able to view your health information using other applications (apps) compatible with our system.

## 2024-04-01 NOTE — ED ADULT NURSE NOTE - NSFALLRISKINTERV_ED_ALL_ED

## 2024-04-01 NOTE — ED PROVIDER NOTE - OBJECTIVE STATEMENT
24-year-old female presents today due to a left ankle injury yesterday.  Patient reports that she was on the couch sitting on her leg in which her leg fell asleep.  Patient tried to stand up on which she could not feel her foot and twisted her ankle.  Patient got up again and twisted her ankle again.  Patient describes pain to the left lateral ankle as aching, nonradiating, and currently 7 out of 10.  Patient with some swelling to the lateral ankle at the area of the injury.  Patient denies head injury, LOC, vomiting, blood thinner use, knee pain, or any other complaints.

## 2024-04-01 NOTE — ED ADULT TRIAGE NOTE - NS ED TRIAGE AVPU SCALE
13-Feb-2023 Alert-The patient is alert, awake and responds to voice. The patient is oriented to time, place, and person. The triage nurse is able to obtain subjective information.

## 2024-04-01 NOTE — ED PROVIDER NOTE - PROVIDER TOKENS
PROVIDER:[TOKEN:[[id number]],FOLLOWUP:[1-3 Days]],PROVIDER:[TOKEN:[[id number]],FOLLOWUP:[1-3 Days]]

## 2024-04-01 NOTE — ED PROVIDER NOTE - CLINICAL SUMMARY MEDICAL DECISION MAKING FREE TEXT BOX
24-year-old female presents today due to a left ankle injury yesterday.  Patient reports that she was on the couch sitting on her leg in which her leg fell asleep.  Patient tried to stand up on which she could not feel her foot and twisted her ankle.  Patient got up again and twisted her ankle again.  Patient describes pain to the left lateral ankle as aching, nonradiating, and currently 7 out of 10.  Patient with some swelling to the lateral ankle at the area of the injury.  Patient denies head injury, LOC, vomiting, blood thinner use, knee pain, or any other complaints.    Physical exam vital signs stable afebrile no distress.  Extremity exam there is minor swelling and tenderness left lateral malleolus but no bony deformity.  No joint instability.  Full range of motion pulses sensation intact.  Remainder of exam is unremarkable.  Impression is ankle injury rule out fracture.  Plan is x-ray splint or wrap as needed and Ortho follow-up.

## 2024-04-01 NOTE — ED PROVIDER NOTE - NSFOLLOWUPINSTRUCTIONS_ED_ALL_ED_FT
1) Follow-up with Orthopedics/podiatry, See referred doctor. Call today/next business day for close, prompt follow-up.  2) Return to Emergency room for any worsening or persistent pain, weakness, numbness, fever, color change to extremity, or any other concerning symptoms.  3) Take ibuprofen 600 mg every  6 hours as needed.   4) You can consider discussing with your doctor if physical therapy or further imaging as an MRI may be beneficial. 1) Follow-up with Orthopedics/podiatry, See referred doctor. Call today/next business day for close, prompt follow-up.  2) Return to Emergency room for any worsening or persistent pain, weakness, numbness, fever, color change to extremity, or any other concerning symptoms.  3) Take ibuprofen 600 mg every  6 hours as needed.   4) You can consider discussing with your doctor if physical therapy or further imaging as an MRI may be beneficial.      Ankle Pain    The ankle joint helps you stand on your leg and allows you to move around. Ankle pain can happen on either side or the back of the ankle. You may have pain in one ankle or both ankles. Ankle pain may be sharp and burning or dull and aching. There may be tenderness, stiffness, redness, or warmth around the ankle.    Many things can cause ankle pain. These include an injury to the area and overuse of your ankle.    Follow these instructions at home:  Activity    Rest your ankle as told by your health care provider. Avoid doing things that cause ankle pain.  Do not use the injured limb to support your body weight until your provider says that you can. Use crutches as told by your provider.  Ask your provider when it is safe to drive if you have a brace on your ankle.  Do exercises as told by your provider.  If you have a removable brace:    Wear the brace as told by your provider. Remove it only as told by your provider.  Check the skin around the brace every day. Tell your provider about any concerns.  Loosen the brace if your toes tingle, become numb, or turn cold and blue.  Keep the brace clean.  If the brace is not waterproof:  Do not let it get wet.  Cover it with a watertight covering when you take a bath or shower.  If you have an elastic bandage:    A person wrapping a bandage around an ankle.  Remove it when you take a bath or a shower.  Try not to move your ankle much. Wiggle your toes from time to time. This helps to prevent swelling.  Adjust the bandage if it feels too tight.  Loosen the bandage if your foot tingles, becomes numb, or turns cold and blue.  Managing pain, stiffness, and swelling    Bag of ice on a towel on the skin.  If told, put ice on the painful area.  If you have a removable brace or elastic bandage, remove it as told by your provider.  Put ice in a plastic bag.  Place a towel between your skin and the bag.  Leave the ice on for 20 minutes, 2–3 times a day.  If your skin turns bright red, remove the ice right away to prevent skin damage. The risk of damage is higher if you cannot feel pain, heat, or cold.  Move your toes often to reduce stiffness and swelling.  Raise (elevate) your ankle above the level of your heart while you are sitting or lying down.  General instructions    Take over-the-counter and prescription medicines only as told by your provider.  To help you and your provider, write down:  How often you have ankle pain.  Where the pain is.  What the pain feels like.  If you are told to wear a certain shoe or insole, make sure you wear it the right way and for as long as you are told.  Contact a health care provider if:  Your pain gets worse.  Your pain does not get better with medicine.  You have a fever or chills.  You have more trouble walking.  You have new symptoms.  Your foot, leg, toes, or ankle tingles, becomes numb or swollen, or turns cold and blue.  This information is not intended to replace advice given to you by your health care provider. Make sure you discuss any questions you have with your health care provider.    Document Revised: 10/11/2023 Document Reviewed: 10/11/2023  Elsevier Patient Education © 2024 [employer]

## 2024-04-01 NOTE — ED PROVIDER NOTE - CARE PROVIDERS DIRECT ADDRESSES
Detail Level: Detailed Quality 130: Documentation Of Current Medications In The Medical Record: Current Medications Documented ,DirectAddress_Unknown,[email or web address]

## 2024-07-16 ENCOUNTER — NON-APPOINTMENT (OUTPATIENT)
Age: 24
End: 2024-07-16

## 2024-07-17 ENCOUNTER — APPOINTMENT (OUTPATIENT)
Dept: ULTRASOUND IMAGING | Facility: CLINIC | Age: 24
End: 2024-07-17
Payer: MEDICAID

## 2024-07-17 ENCOUNTER — OUTPATIENT (OUTPATIENT)
Dept: OUTPATIENT SERVICES | Facility: HOSPITAL | Age: 24
LOS: 1 days | End: 2024-07-17
Payer: MEDICAID

## 2024-07-17 DIAGNOSIS — Z00.8 ENCOUNTER FOR OTHER GENERAL EXAMINATION: ICD-10-CM

## 2024-07-17 PROCEDURE — 76882 US LMTD JT/FCL EVL NVASC XTR: CPT | Mod: 26,LT

## 2024-07-17 PROCEDURE — 76882 US LMTD JT/FCL EVL NVASC XTR: CPT

## 2024-08-19 ENCOUNTER — NON-APPOINTMENT (OUTPATIENT)
Age: 24
End: 2024-08-19

## 2025-01-14 ENCOUNTER — EMERGENCY (EMERGENCY)
Facility: HOSPITAL | Age: 25
LOS: 0 days | Discharge: HOME | End: 2025-01-14
Attending: STUDENT IN AN ORGANIZED HEALTH CARE EDUCATION/TRAINING PROGRAM
Payer: MEDICAID

## 2025-01-14 VITALS
DIASTOLIC BLOOD PRESSURE: 58 MMHG | OXYGEN SATURATION: 98 % | SYSTOLIC BLOOD PRESSURE: 104 MMHG | TEMPERATURE: 100 F | HEART RATE: 110 BPM | RESPIRATION RATE: 18 BRPM

## 2025-01-14 DIAGNOSIS — B34.9 VIRAL INFECTION, UNSPECIFIED: ICD-10-CM

## 2025-01-14 DIAGNOSIS — Z88.0 ALLERGY STATUS TO PENICILLIN: ICD-10-CM

## 2025-01-14 DIAGNOSIS — R50.9 FEVER, UNSPECIFIED: ICD-10-CM

## 2025-01-14 PROCEDURE — 99284 EMERGENCY DEPT VISIT MOD MDM: CPT

## 2025-01-14 PROCEDURE — 99283 EMERGENCY DEPT VISIT LOW MDM: CPT

## 2025-01-14 RX ORDER — FAMOTIDINE 20 MG/1
20 TABLET, FILM COATED ORAL ONCE
Refills: 0 | Status: COMPLETED | OUTPATIENT
Start: 2025-01-14 | End: 2025-01-14

## 2025-01-14 RX ORDER — IBUPROFEN 200 MG
600 TABLET ORAL ONCE
Refills: 0 | Status: COMPLETED | OUTPATIENT
Start: 2025-01-14 | End: 2025-01-14

## 2025-01-14 RX ORDER — ONDANSETRON 4 MG/1
1 TABLET ORAL
Qty: 9 | Refills: 0
Start: 2025-01-14 | End: 2025-01-16

## 2025-01-14 RX ORDER — ONDANSETRON 4 MG/1
4 TABLET ORAL ONCE
Refills: 0 | Status: COMPLETED | OUTPATIENT
Start: 2025-01-14 | End: 2025-01-14

## 2025-01-14 RX ADMIN — ONDANSETRON 4 MILLIGRAM(S): 4 TABLET ORAL at 22:46

## 2025-01-14 RX ADMIN — Medication 600 MILLIGRAM(S): at 22:46

## 2025-01-14 RX ADMIN — FAMOTIDINE 20 MILLIGRAM(S): 20 TABLET, FILM COATED ORAL at 22:46

## 2025-01-14 NOTE — ED STATDOCS - NSFOLLOWUPINSTRUCTIONS_ED_ALL_ED_FT
Take motrin 600mg every 6 hours as needed for pain   Take tylenol 650 mg every 4 - 6 hours as needed for pain   Drink plenty of fluids and advance as tolerated   Return to the ED if any worsening symptoms.       Viral Illness, Adult  Viruses are tiny germs that can get into a person's body and cause illness. There are many different types of viruses. And they cause many types of illness. Viral illnesses can range from mild to severe. They can affect various parts of the body.    Short-term conditions that are caused by a virus include colds and flu (influenza) and stomach viruses. Long-term conditions that are caused by a virus include herpes, shingles, and human immunodeficiency virus (HIV) infection. A few viruses have been linked to certain cancers.    What are the causes?  Many types of viruses can cause illness. Viruses get into cells in your body, multiply, and cause the infected cells to work differently or die. When these cells die, they release more of the virus. When this happens, you get symptoms of the illness and the virus spreads to other cells. If the virus takes over how the cell works, it can cause the cell to divide and grow out of control. This happens when a virus causes cancer.    Different viruses get into the body in different ways. You can get a virus by:  Swallowing food or water that has come in contact with the virus.  Breathing in droplets that have been coughed or sneezed into the air by an infected person.  Touching a surface that has the virus on it and then touching your eyes, nose, or mouth.  Being bitten by an insect or animal that carries the virus.  Having sexual contact with a person who is infected with the virus.  Being exposed to blood or fluids that contain the virus, either through an open cut or during a transfusion.  If a virus enters your body, your body's disease-fighting system (immune system) will try to fight the virus. You may be at higher risk for a viral illness if your immune system is weak.    What are the signs or symptoms?  Symptoms depend on the type of virus and the location of the cells that it gets into. Symptoms can include:  For cold and flu viruses:  Fever.  Headache.  Sore throat.  Muscle aches.  Stuffy nose (nasal congestion).  Cough.  For stomach (gastrointestinal) viruses:  Fever.  Pain in the abdomen.  Nausea or vomiting.  Diarrhea.  For liver viruses (hepatitis):  Loss of appetite.  Feeling tired.  Skin or the white parts of your eyes turning yellow (jaundice).  For brain and spinal cord viruses:  Fever.  Headache.  Stiff neck.  Nausea and vomiting.  Confusion or being sleepy.  For skin viruses:  Warts.  Itching.  Rash.  For sexually transmitted viruses:  Discharge.  Swelling.  Redness.  Rash.  How is this diagnosed?  This condition may be diagnosed based on one or more of these:  Your symptoms and medical history.  A physical exam.  Tests, such as:  Blood tests.  Tests on a sample of mucus from your lungs (sputum sample).  Tests on a poop (stool) sample.  Tests on a swab of body fluids or a skin sore (lesion).  How is this treated?  Viruses can be hard to treat because they live within cells. Antibiotics do not treat viruses because these medicines do not get inside cells. Treatment for a viral illness may include:  Resting and drinking a lot of fluids.  Medicines to treat symptoms. These can include over-the-counter medicine for pain and fever, medicines for cough or congestion, and medicines for diarrhea.  Antiviral medicines. These medicines are available only for certain types of viruses.  Some viral illnesses can be prevented with vaccinations. A common example is the flu shot.    Follow these instructions at home:  Medicines    Take over-the-counter and prescription medicines only as told by your health care provider.  If you were prescribed an antiviral medicine, take it as told by your provider. Do not stop taking the antiviral even if you start to feel better.  Know when antibiotics are needed and when they are not needed. Antibiotics do not treat viruses. You may get an antibiotic if your provider thinks that you may have, or are at risk for, a bacterial infection and you have a viral infection.  Do not ask for an antibiotic prescription if you have been diagnosed with a viral illness. Antibiotics will not make your illness go away faster.  Taking antibiotics when they are not needed can lead to antibiotic resistance. When this develops, the medicine no longer works against the bacteria that it normally fights.  General instructions    Drink enough fluids to keep your pee (urine) pale yellow.  Rest as much as possible.  Return to your normal activities as told by your provider. Ask your provider what activities are safe for you.  How is this prevented?  A person washing hands with soap and water.  To lower your risk of getting another viral illness:  Wash your hands often with soap and water for at least 20 seconds. If soap and water are not available, use hand .  Avoid touching your nose, eyes, and mouth, especially if you have not washed your hands recently.  If anyone in your household has a viral infection, clean all household surfaces that may have been in contact with the virus. Use soap and hot water. You may also use a commercially prepared, bleach-containing solution.  Stay away from people who are sick with symptoms of a viral infection.  Do not share items such as toothbrushes and water bottles with other people.  Keep your vaccinations up to date. This includes getting a yearly flu shot.  Eat a healthy diet and get plenty of rest.  Contact a health care provider if:  You have symptoms of a viral illness that do not go away.  Your symptoms come back after going away.  Your symptoms get worse.  Get help right away if:  You have trouble breathing.  You have a severe headache or a stiff neck.  You have severe vomiting or pain in your abdomen.  These symptoms may be an emergency. Get help right away. Call 911.  Do not wait to see if the symptoms will go away.  Do not drive yourself to the hospital.  This information is not intended to replace advice given to you by your health care provider. Make sure you discuss any questions you have with your health care provider.

## 2025-01-14 NOTE — ED STATDOCS - OBJECTIVE STATEMENT
25 y/o F with PMHx of PTSD, anxiety presents to the ED c/o flu-like sx. Pt endorses fever, n/v, body aches, headache since yesterday. Pt has taken Tylenol last time 1.5 hours ago. Pt denies sick contact at home. Pt smokes marijuana and drinks EtOH on occasion.

## 2025-01-14 NOTE — ED STATDOCS - PROGRESS NOTE DETAILS
ANAM Mckeon: I participated in the care of this patient. I agree with the history, physical and plan.   pt stable for dc with supporative tx.

## 2025-01-14 NOTE — ED STATDOCS - PHYSICAL EXAMINATION
GEN: Patient awake alert NAD.   HEENT: normocephalic, atraumatic, EOMI, no scleral icterus, moist MM  CARDIAC: RRR, S1, S2, no murmur.   PULM: CTA B/L no wheeze, rhonchi, rales.   ABD: soft NT, ND, no rebound no guarding, no CVA tenderness.   MSK: Moving all extremities, no edema. 5/5 strength and full ROM in all extremities.     NEURO: A&Ox3, no focal neurological deficits  SKIN: warm, dry, no rash. GEN: Patient awake alert NAD.   HEENT: normocephalic, atraumatic, EOMI, no scleral icterus, moist MM, normal orpharynx   CARDIAC: RRR, S1, S2, no murmur.   PULM: CTA B/L no wheeze, rhonchi, rales.   ABD: soft NT, ND, no rebound no guarding, no CVA tenderness.   MSK: Moving all extremities, no edema.   NEURO: A&Ox3, no focal neurological deficits  SKIN: warm, dry, no rash.

## 2025-01-14 NOTE — ED STATDOCS - CLINICAL SUMMARY MEDICAL DECISION MAKING FREE TEXT BOX
23 y/o F with PMHx of PTSD, anxiety presents to the ED c/o flu-like sx. Pt endorses fever, n/v, body aches, headache since yesterday. Patient very well-appearing, hemodynamically stable with nonfocal exam.  Differential includes not limited to viral syndrome.  Plan for supportive care and discharge.

## 2025-01-14 NOTE — ED STATDOCS - PATIENT PORTAL LINK FT
You can access the FollowMyHealth Patient Portal offered by [facility] by registering at the following website: [email or web address]. By joining [employer]’s FollowMyHealth portal, you will also be able to view your health information using other applications (apps) compatible with our system.

## 2025-01-14 NOTE — ED ADULT TRIAGE NOTE - CHIEF COMPLAINT QUOTE
Pt ambulatory to ed c/o of flu like symptoms. Pt endorses fever tmax 102.9,  cough, congestion, vomiting, generalized body aches, headaches. PTA pt took Tylenol 1hr prior to arrival with no relief. Pt denies sick contact. SpO2 98% on room air, heart rate 122. No acute distressed noted at this time.

## 2025-05-30 ENCOUNTER — NON-APPOINTMENT (OUTPATIENT)
Age: 25
End: 2025-05-30